# Patient Record
Sex: FEMALE | Race: WHITE | NOT HISPANIC OR LATINO | URBAN - NONMETROPOLITAN AREA
[De-identification: names, ages, dates, MRNs, and addresses within clinical notes are randomized per-mention and may not be internally consistent; named-entity substitution may affect disease eponyms.]

---

## 2023-10-20 ENCOUNTER — TELEPHONE ENCOUNTER (OUTPATIENT)
Dept: URBAN - NONMETROPOLITAN AREA CLINIC 2 | Facility: CLINIC | Age: 48
End: 2023-10-20

## 2023-10-20 ENCOUNTER — OFFICE VISIT (OUTPATIENT)
Dept: URBAN - NONMETROPOLITAN AREA CLINIC 2 | Facility: CLINIC | Age: 48
End: 2023-10-20
Payer: COMMERCIAL

## 2023-10-20 ENCOUNTER — LAB OUTSIDE AN ENCOUNTER (OUTPATIENT)
Dept: URBAN - NONMETROPOLITAN AREA CLINIC 2 | Facility: CLINIC | Age: 48
End: 2023-10-20

## 2023-10-20 DIAGNOSIS — K58.0 IRRITABLE BOWEL SYNDROME WITH DIARRHEA: ICD-10-CM

## 2023-10-20 DIAGNOSIS — K92.1 BLOOD IN STOOL: ICD-10-CM

## 2023-10-20 PROBLEM — 197125005: Status: ACTIVE | Noted: 2023-10-20

## 2023-10-20 PROCEDURE — 99203 OFFICE O/P NEW LOW 30 MIN: CPT | Performed by: NURSE PRACTITIONER

## 2023-10-20 RX ORDER — SODIUM PICOSULFATE, MAGNESIUM OXIDE, AND ANHYDROUS CITRIC ACID 10; 3.5; 12 MG/16.2G; G/16.2G; G/16.2G
TAKE KIT AS DIRECTED BY THE OFFICE POWDER, METERED ORAL
Qty: 1 | Refills: 0 | Status: ACTIVE | COMMUNITY
Start: 2017-05-16 | End: 1900-01-01

## 2023-10-20 NOTE — HPI-TODAY'S VISIT:
Ms. Mercedez Morales is a 48-year-old female who presents today for blood in her stool.  She has a history of IBS-D.  January through September her stool looser stool.  She scheduled a screening colonoscopy for this reason.  Number she then experienced runny diarrhea and took Imodium, which made her with constipation.  This was resolved completely about 2 weeks ago and her stools are now more formed than usual.  Over the weekend she noticed a creamy pink discharge.  Her cousin has colon cancer she became concerned.  She works as a gynecologist and had guaiac test at home, so she took 3, all of which were positive.  She thinks she may have little external hemorrhoids but they do not usually bleed.  She is concerned about malignancy and wants to rule this out with colonoscopy.  Weight has been stable.  She denies any nocturnal bowel movements.  Last colonoscopy in 2017 after experiencing jellylike blood in her stool.  Colon was normal and this was thought due to recent Macrobid prescription.  Mercedez reports that she has not been on any antibiotics recently.  LG.

## 2023-10-20 NOTE — PHYSICAL EXAM CONSTITUTIONAL:
well developed, well nourished , in no acute distress , ambulating without difficulty , normal communication ability , well developed, well nourished , in no acute distress , ambulating without difficulty , normal communication ability 
None

## 2023-10-25 ENCOUNTER — WEB ENCOUNTER (OUTPATIENT)
Dept: URBAN - NONMETROPOLITAN AREA CLINIC 2 | Facility: CLINIC | Age: 48
End: 2023-10-25

## 2023-10-25 ENCOUNTER — TELEPHONE ENCOUNTER (OUTPATIENT)
Dept: URBAN - NONMETROPOLITAN AREA CLINIC 2 | Facility: CLINIC | Age: 48
End: 2023-10-25

## 2023-10-25 RX ORDER — HYDROCORTISONE ACETATE 25 MG/1
1 SUPPOSITORY SUPPOSITORY RECTAL ONCE A DAY
Qty: 30 | Refills: 1 | OUTPATIENT
Start: 2023-10-26 | End: 2023-12-24

## 2023-10-26 ENCOUNTER — WEB ENCOUNTER (OUTPATIENT)
Dept: URBAN - NONMETROPOLITAN AREA CLINIC 2 | Facility: CLINIC | Age: 48
End: 2023-10-26

## 2023-11-07 ENCOUNTER — CLAIMS CREATED FROM THE CLAIM WINDOW (OUTPATIENT)
Dept: URBAN - NONMETROPOLITAN AREA SURGERY CENTER 1 | Facility: SURGERY CENTER | Age: 48
End: 2023-11-07
Payer: COMMERCIAL

## 2023-11-07 DIAGNOSIS — K62.5 RECTAL BLEEDING: ICD-10-CM

## 2023-11-07 DIAGNOSIS — K62.5 ANAL BLEEDING: ICD-10-CM

## 2023-11-07 DIAGNOSIS — K64.8 INTERNAL HEMORRHOIDS: ICD-10-CM

## 2023-11-07 DIAGNOSIS — R19.7 DIARRHEA, UNSPECIFIED TYPE: ICD-10-CM

## 2023-11-07 DIAGNOSIS — R19.7 ACUTE DIARRHEA: ICD-10-CM

## 2023-11-07 PROCEDURE — 45380 COLONOSCOPY AND BIOPSY: CPT | Performed by: INTERNAL MEDICINE

## 2023-11-07 PROCEDURE — G8907 PT DOC NO EVENTS ON DISCHARG: HCPCS | Performed by: INTERNAL MEDICINE

## 2023-11-07 PROCEDURE — 00811 ANES LWR INTST NDSC NOS: CPT | Performed by: NURSE ANESTHETIST, CERTIFIED REGISTERED

## 2024-07-12 ENCOUNTER — P2P PATIENT RECORD (OUTPATIENT)
Age: 49
End: 2024-07-12

## 2024-07-18 ENCOUNTER — WEB ENCOUNTER (OUTPATIENT)
Dept: URBAN - NONMETROPOLITAN AREA CLINIC 2 | Facility: CLINIC | Age: 49
End: 2024-07-18

## 2024-07-18 ENCOUNTER — DASHBOARD ENCOUNTERS (OUTPATIENT)
Age: 49
End: 2024-07-18

## 2024-07-18 ENCOUNTER — OFFICE VISIT (OUTPATIENT)
Dept: URBAN - NONMETROPOLITAN AREA CLINIC 2 | Facility: CLINIC | Age: 49
End: 2024-07-18
Payer: COMMERCIAL

## 2024-07-18 VITALS
DIASTOLIC BLOOD PRESSURE: 73 MMHG | BODY MASS INDEX: 26.11 KG/M2 | HEART RATE: 85 BPM | SYSTOLIC BLOOD PRESSURE: 105 MMHG | HEIGHT: 60 IN | WEIGHT: 133 LBS

## 2024-07-18 DIAGNOSIS — K58.0 IRRITABLE BOWEL SYNDROME WITH DIARRHEA: ICD-10-CM

## 2024-07-18 DIAGNOSIS — K92.1 BLOOD IN STOOL: ICD-10-CM

## 2024-07-18 DIAGNOSIS — R10.13 EPIGASTRIC PAIN: ICD-10-CM

## 2024-07-18 PROCEDURE — 99214 OFFICE O/P EST MOD 30 MIN: CPT | Performed by: NURSE PRACTITIONER

## 2024-07-18 RX ORDER — FAMOTIDINE 40 MG/1
1 TABLET TABLET, FILM COATED ORAL TWICE A DAY
Qty: 180 TABLET | Refills: 3
Start: 2024-07-18

## 2024-07-18 RX ORDER — FAMOTIDINE 40 MG/1
1 TABLET TABLET, FILM COATED ORAL TWICE A DAY
Qty: 180 TABLET | Refills: 3 | OUTPATIENT
Start: 2024-07-18

## 2024-07-18 RX ORDER — SUCRALFATE 1 G/1
1 TABLET ON AN EMPTY STOMACH TABLET ORAL TWICE A DAY
Status: ACTIVE | COMMUNITY

## 2024-07-18 RX ORDER — SODIUM PICOSULFATE, MAGNESIUM OXIDE, AND ANHYDROUS CITRIC ACID 10; 3.5; 12 MG/16.2G; G/16.2G; G/16.2G
TAKE KIT AS DIRECTED BY THE OFFICE POWDER, METERED ORAL
Qty: 1 | Refills: 0 | Status: ON HOLD | COMMUNITY
Start: 2017-05-16

## 2024-07-18 RX ORDER — PANTOPRAZOLE SODIUM 40 MG/1
1 TABLET TABLET, DELAYED RELEASE ORAL ONCE A DAY
Status: ACTIVE | COMMUNITY

## 2024-07-18 NOTE — HPI-TODAY'S VISIT:
Patient is a pleasant 49-year-old female previously seen for colonoscopy last year who presents today with a new complaint of epigastric discomfort.  Symptoms started early June.  She does not typically have symptoms like these.  She started having some epigastric discomfort.  This eventually changed and seem to be more left upper quad discomfort.  Bowel movements are regular.  She did have a slight globus sensation.  She has lots of bloating.  Coffee or alcohol can be a trigger.  She has tried a Prilosec OTC as well as pantoprazole 40 mg and Carafate she is about 50% improved.  She did do a brief round of amoxicillin in case there was H. pylori. 2023 colonoscopy with small internal hemorrhoids and negative random biopsies and 5-year repeat recommended sb

## 2024-07-31 ENCOUNTER — WEB ENCOUNTER (OUTPATIENT)
Dept: URBAN - NONMETROPOLITAN AREA CLINIC 2 | Facility: CLINIC | Age: 49
End: 2024-07-31

## 2024-07-31 LAB
H PYLORI BREATH TEST: NEGATIVE
Lab: (no result)